# Patient Record
Sex: MALE | Race: WHITE | NOT HISPANIC OR LATINO | URBAN - METROPOLITAN AREA
[De-identification: names, ages, dates, MRNs, and addresses within clinical notes are randomized per-mention and may not be internally consistent; named-entity substitution may affect disease eponyms.]

---

## 2023-01-26 ENCOUNTER — OFFICE (OUTPATIENT)
Dept: URBAN - METROPOLITAN AREA CLINIC 34 | Facility: CLINIC | Age: 88
End: 2023-01-26
Payer: COMMERCIAL

## 2023-01-26 VITALS
HEART RATE: 86 BPM | DIASTOLIC BLOOD PRESSURE: 101 MMHG | HEIGHT: 73 IN | SYSTOLIC BLOOD PRESSURE: 138 MMHG | TEMPERATURE: 97.5 F | WEIGHT: 222 LBS

## 2023-01-26 DIAGNOSIS — K86.2 CYST OF PANCREAS: ICD-10-CM

## 2023-01-26 LAB
CA 19-9: 9 U/ML (ref 0–35)
HEPATIC FUNCTION PANEL (7): ALBUMIN: 4.2 G/DL (ref 3.6–4.6)
HEPATIC FUNCTION PANEL (7): ALKALINE PHOSPHATASE: 69 IU/L (ref 44–121)
HEPATIC FUNCTION PANEL (7): ALT (SGPT): 22 IU/L (ref 0–44)
HEPATIC FUNCTION PANEL (7): AST (SGOT): 23 IU/L (ref 0–40)
HEPATIC FUNCTION PANEL (7): BILIRUBIN, DIRECT: 0.18 MG/DL (ref 0–0.4)
HEPATIC FUNCTION PANEL (7): BILIRUBIN, TOTAL: 0.6 MG/DL (ref 0–1.2)
HEPATIC FUNCTION PANEL (7): PROTEIN, TOTAL: 6.1 G/DL (ref 6–8.5)

## 2023-01-26 PROCEDURE — 99204 OFFICE O/P NEW MOD 45 MIN: CPT | Performed by: NURSE PRACTITIONER

## 2023-01-26 NOTE — SERVICEHPINOTES
Mr. Browne is an 87-year-old male with a past medical history significant for an ICD, cholecystectomy, CAD with stent on Xarelto and an MI who presents to the clinic today for abnormal images of the pancreas. He began having flank pain in November 2022. He was seen by his primary care physician who ordered a CT scan without contrast of the abdomen and pelvis and was found to have kidney stones. His symptoms got better. His PCP ordered a follow up complete abdominal ultrasound on December 2, 2022, which showed a normal liver, prior cholecystectomy, normal bile ducts and a 2.2 x 0.9 x 0.9 cm hypoechoic lesion in the region of the distal pancreatic body/tail. His primary care then ordered a CT scan with contrast of the abdomen and pelvis which confirmed of 1.5 x 1.2 cm cystic lesion along the anterior aspect of the junction of the body and tail.Mr. Browne denies any prior known pancreatic disease. He denies abdominal pain, weight loss, nausea, vomiting, night sweats, or chills. He denies any family history of pancreatic cancer. He was a heavy drinker earlier in life but has a drink every once in a while.